# Patient Record
Sex: MALE
[De-identification: names, ages, dates, MRNs, and addresses within clinical notes are randomized per-mention and may not be internally consistent; named-entity substitution may affect disease eponyms.]

---

## 2023-07-04 ENCOUNTER — HOSPITAL ENCOUNTER (EMERGENCY)
Dept: HOSPITAL 97 - ER | Age: 43
Discharge: HOME | End: 2023-07-04
Payer: COMMERCIAL

## 2023-07-04 DIAGNOSIS — S01.01XA: Primary | ICD-10-CM
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
## 2023-07-04 DIAGNOSIS — S81.812A: ICD-10-CM

## 2023-07-04 DIAGNOSIS — Z23: ICD-10-CM

## 2023-07-04 PROCEDURE — 90714 TD VACC NO PRESV 7 YRS+ IM: CPT

## 2023-07-04 PROCEDURE — 0HQ0XZZ REPAIR SCALP SKIN, EXTERNAL APPROACH: ICD-10-PCS

## 2023-07-04 PROCEDURE — 70450 CT HEAD/BRAIN W/O DYE: CPT

## 2023-07-04 PROCEDURE — 72125 CT NECK SPINE W/O DYE: CPT

## 2023-07-04 PROCEDURE — 99284 EMERGENCY DEPT VISIT MOD MDM: CPT

## 2023-07-04 PROCEDURE — 90471 IMMUNIZATION ADMIN: CPT

## 2023-07-04 NOTE — RAD REPORT
EXAM DESCRIPTION:

CT - Head C Spine Mpr Wo Con - 7/4/2023 7:06 am

 

 

CLINICAL HISTORY:  Headache;Trauma

 

TECHNIQUE:  Contiguous axial CT images obtained through the brain without IV contrast. Coronal and sa
gittal reformatted images were provided.

This exam was performed according to our departmental dose-optimization program, which includes autom
ated exposure control, adjustment of the mA and/or kV according to patient size and/or use of iterati
ve reconstruction technique.

 

COMPARISON:  None available for comparison

 

FINDINGS:  Brain: No significant white matter changes. No focal mass effect. Gray-white matter differ
entiation is within normal limits. No hemorrhage.

Ventricles: No ventriculomegaly or midline shift.

Extra-axial spaces: No extra-axial collection or hemorrhage.

Paranasal sinuses and mastoid air cells: Well-aerated

Bones: Unremarkable

Soft tissues: Small left frontal scalp hematoma.

 

IMPRESSION:  No acute intracranial injury.

Small left frontal scalp hematoma.

 

EXAM DESCRIPTION:  Head C Spine Mpr Wo Con

 

CLINICAL HISTORY:  Headache;Trauma

 

TECHNIQUE:  Contiguous axial CT images obtained through the cervical spine without   IV contrast. Cor
onal and sagittal reformatted images also provided.

This exam was performed according to our departmental dose-optimization program, which includes autom
ated exposure control, adjustment of the mA and/or kV according to patient size and/or use of iterati
ve reconstruction technique.

 

COMPARISON:  None available for comparison

 

FINDINGS:  Vertebra: No acute fracture or subluxation.

Degenerative changes: Intervertebral disc spaces are fairly well maintained. No critical canal stenos
is. Foramina appear patent.

Prevertebral soft tissues: Unremarkable

Lung apices: Clear

 

IMPRESSION:  No acute cervical spine fracture or malalignment.

 

Electronically signed by:   Getachew Spann MD   7/4/2023 3:44 AM CDT Workstation: [id number]

 

 

Due to temporary technical issues with the PACS/Fluency reporting system, reports are being signed by
 the in house radiologists without review as a courtesy to insure prompt reporting. The interpreting 
radiologist is fully responsible for the content of the report.
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
